# Patient Record
Sex: FEMALE | Race: WHITE | NOT HISPANIC OR LATINO | URBAN - METROPOLITAN AREA
[De-identification: names, ages, dates, MRNs, and addresses within clinical notes are randomized per-mention and may not be internally consistent; named-entity substitution may affect disease eponyms.]

---

## 2020-03-12 ENCOUNTER — APPOINTMENT (OUTPATIENT)
Dept: URBAN - METROPOLITAN AREA SURGERY 9 | Age: 55
Setting detail: DERMATOLOGY
End: 2020-03-12

## 2020-03-12 DIAGNOSIS — L30.8 OTHER SPECIFIED DERMATITIS: ICD-10-CM

## 2020-03-12 DIAGNOSIS — L81.4 OTHER MELANIN HYPERPIGMENTATION: ICD-10-CM

## 2020-03-12 DIAGNOSIS — L30.4 ERYTHEMA INTERTRIGO: ICD-10-CM

## 2020-03-12 DIAGNOSIS — L91.8 OTHER HYPERTROPHIC DISORDERS OF THE SKIN: ICD-10-CM

## 2020-03-12 DIAGNOSIS — D22 MELANOCYTIC NEVI: ICD-10-CM

## 2020-03-12 DIAGNOSIS — L82.1 OTHER SEBORRHEIC KERATOSIS: ICD-10-CM

## 2020-03-12 PROBLEM — D23.71 OTHER BENIGN NEOPLASM OF SKIN OF RIGHT LOWER LIMB, INCLUDING HIP: Status: ACTIVE | Noted: 2020-03-12

## 2020-03-12 PROBLEM — D22.5 MELANOCYTIC NEVI OF TRUNK: Status: ACTIVE | Noted: 2020-03-12

## 2020-03-12 PROCEDURE — OTHER REASSURANCE: OTHER

## 2020-03-12 PROCEDURE — OTHER PRESCRIPTION: OTHER

## 2020-03-12 PROCEDURE — OTHER COUNSELING: OTHER

## 2020-03-12 PROCEDURE — OTHER TREATMENT REGIMEN: OTHER

## 2020-03-12 PROCEDURE — OTHER OTHER: OTHER

## 2020-03-12 PROCEDURE — 99203 OFFICE O/P NEW LOW 30 MIN: CPT

## 2020-03-12 RX ORDER — FLUOCINONIDE 0.5 MG/G
CREAM TOPICAL
Qty: 1 | Refills: 0 | Status: ERX | COMMUNITY
Start: 2020-03-12

## 2020-03-12 ASSESSMENT — LOCATION ZONE DERM
LOCATION ZONE: ARM
LOCATION ZONE: HAND
LOCATION ZONE: TRUNK
LOCATION ZONE: FACE
LOCATION ZONE: LEG

## 2020-03-12 ASSESSMENT — LOCATION DETAILED DESCRIPTION DERM
LOCATION DETAILED: GLUTEAL CLEFT
LOCATION DETAILED: RIGHT RADIAL DORSAL HAND
LOCATION DETAILED: LEFT INFRAMAMMARY CREASE (INNER QUADRANT)
LOCATION DETAILED: RIGHT INFRAMAMMARY CREASE (INNER QUADRANT)
LOCATION DETAILED: LEFT MEDIAL FOREHEAD
LOCATION DETAILED: LEFT LATERAL SUPERIOR CHEST
LOCATION DETAILED: RIGHT PROXIMAL CALF
LOCATION DETAILED: LEFT ANTERIOR PROXIMAL THIGH
LOCATION DETAILED: RIGHT CENTRAL MALAR CHEEK
LOCATION DETAILED: LEFT ULNAR DORSAL HAND
LOCATION DETAILED: RIGHT ANTERIOR PROXIMAL THIGH
LOCATION DETAILED: LEFT INFERIOR CENTRAL MALAR CHEEK
LOCATION DETAILED: RIGHT SUPERIOR UPPER BACK
LOCATION DETAILED: RIGHT PROXIMAL POSTERIOR UPPER ARM
LOCATION DETAILED: LEFT PROXIMAL POSTERIOR UPPER ARM

## 2020-03-12 ASSESSMENT — LOCATION SIMPLE DESCRIPTION DERM
LOCATION SIMPLE: LEFT THIGH
LOCATION SIMPLE: LEFT FOREHEAD
LOCATION SIMPLE: RIGHT THIGH
LOCATION SIMPLE: RIGHT UPPER BACK
LOCATION SIMPLE: RIGHT CALF
LOCATION SIMPLE: RIGHT CHEEK
LOCATION SIMPLE: RIGHT BREAST
LOCATION SIMPLE: LEFT HAND
LOCATION SIMPLE: GLUTEAL CLEFT
LOCATION SIMPLE: LEFT CHEEK
LOCATION SIMPLE: LEFT BREAST
LOCATION SIMPLE: CHEST
LOCATION SIMPLE: RIGHT HAND
LOCATION SIMPLE: LEFT POSTERIOR UPPER ARM
LOCATION SIMPLE: RIGHT POSTERIOR UPPER ARM

## 2020-03-12 NOTE — PROCEDURE: OTHER
24-Jul-2018 09:11
Other (Free Text): Discussed shave removal if desired
Detail Level: Simple
Note Text (......Xxx Chief Complaint.): This diagnosis correlates with the

## 2020-03-12 NOTE — PROCEDURE: TREATMENT REGIMEN
Detail Level: Simple
Initiate Treatment: Fluocinonide cream as directed
Plan: Patient is being treated by GYN. Has rx’s at home
Otc Regimen: Zeasorb AF powder

## 2021-04-15 ENCOUNTER — APPOINTMENT (OUTPATIENT)
Dept: URBAN - METROPOLITAN AREA SURGERY 9 | Age: 56
Setting detail: DERMATOLOGY
End: 2021-04-15

## 2021-04-15 DIAGNOSIS — L30.4 ERYTHEMA INTERTRIGO: ICD-10-CM

## 2021-04-15 DIAGNOSIS — D18.0 HEMANGIOMA: ICD-10-CM

## 2021-04-15 DIAGNOSIS — D22 MELANOCYTIC NEVI: ICD-10-CM

## 2021-04-15 DIAGNOSIS — L81.4 OTHER MELANIN HYPERPIGMENTATION: ICD-10-CM

## 2021-04-15 DIAGNOSIS — L30.8 OTHER SPECIFIED DERMATITIS: ICD-10-CM

## 2021-04-15 PROBLEM — D23.72 OTHER BENIGN NEOPLASM OF SKIN OF LEFT LOWER LIMB, INCLUDING HIP: Status: ACTIVE | Noted: 2021-04-15

## 2021-04-15 PROBLEM — D22.5 MELANOCYTIC NEVI OF TRUNK: Status: ACTIVE | Noted: 2021-04-15

## 2021-04-15 PROBLEM — D23.71 OTHER BENIGN NEOPLASM OF SKIN OF RIGHT LOWER LIMB, INCLUDING HIP: Status: ACTIVE | Noted: 2021-04-15

## 2021-04-15 PROBLEM — D18.01 HEMANGIOMA OF SKIN AND SUBCUTANEOUS TISSUE: Status: ACTIVE | Noted: 2021-04-15

## 2021-04-15 PROCEDURE — OTHER PRESCRIPTION MEDICATION MANAGEMENT: OTHER

## 2021-04-15 PROCEDURE — OTHER TREATMENT REGIMEN: OTHER

## 2021-04-15 PROCEDURE — OTHER REASSURANCE: OTHER

## 2021-04-15 PROCEDURE — OTHER SUNSCREEN RECOMMENDATIONS: OTHER

## 2021-04-15 PROCEDURE — OTHER OTHER: OTHER

## 2021-04-15 PROCEDURE — OTHER COUNSELING: OTHER

## 2021-04-15 PROCEDURE — 99213 OFFICE O/P EST LOW 20 MIN: CPT

## 2021-04-15 ASSESSMENT — LOCATION ZONE DERM
LOCATION ZONE: TRUNK
LOCATION ZONE: ARM
LOCATION ZONE: FACE
LOCATION ZONE: HAND
LOCATION ZONE: NECK

## 2021-04-15 ASSESSMENT — LOCATION SIMPLE DESCRIPTION DERM
LOCATION SIMPLE: RIGHT UPPER BACK
LOCATION SIMPLE: GLUTEAL CLEFT
LOCATION SIMPLE: LEFT HAND
LOCATION SIMPLE: RIGHT HAND
LOCATION SIMPLE: POSTERIOR NECK
LOCATION SIMPLE: LEFT CHEEK
LOCATION SIMPLE: RIGHT FOREARM
LOCATION SIMPLE: LEFT BREAST
LOCATION SIMPLE: LEFT UPPER BACK
LOCATION SIMPLE: RIGHT BREAST
LOCATION SIMPLE: ABDOMEN
LOCATION SIMPLE: LEFT FOREARM

## 2021-04-15 ASSESSMENT — LOCATION DETAILED DESCRIPTION DERM
LOCATION DETAILED: GLUTEAL CLEFT
LOCATION DETAILED: RIGHT RIB CAGE
LOCATION DETAILED: RIGHT SUPERIOR MEDIAL UPPER BACK
LOCATION DETAILED: LEFT POSTERIOR NECK
LOCATION DETAILED: LEFT RADIAL DORSAL HAND
LOCATION DETAILED: LEFT DISTAL DORSAL FOREARM
LOCATION DETAILED: LEFT INFERIOR CENTRAL MALAR CHEEK
LOCATION DETAILED: LEFT INFRAMAMMARY CREASE (INNER QUADRANT)
LOCATION DETAILED: RIGHT RADIAL DORSAL HAND
LOCATION DETAILED: LEFT SUPERIOR LATERAL UPPER BACK
LOCATION DETAILED: RIGHT PROXIMAL DORSAL FOREARM
LOCATION DETAILED: RIGHT INFRAMAMMARY CREASE (INNER QUADRANT)

## 2021-04-15 NOTE — PROCEDURE: OTHER
Other (Free Text): Discussed shave removal if desired
Detail Level: Simple
Note Text (......Xxx Chief Complaint.): This diagnosis correlates with the

## 2021-04-15 NOTE — PROCEDURE: TREATMENT REGIMEN
Detail Level: Simple
Plan: Patient is being treated by GYN. Has rx’s at home
Otc Regimen: Zeasorb AF powder

## 2021-04-15 NOTE — PROCEDURE: PRESCRIPTION MEDICATION MANAGEMENT
Detail Level: Simple
Continue Regimen: Fluocinonide cream as prescribed. Call for refills when needed
Render In Strict Bullet Format?: No

## 2021-04-15 NOTE — PROCEDURE: REASSURANCE
Detail Level: Generalized
Hide Additional Notes?: No
Detail Level: Simple
Detail Level: Zone
Detail Level: Detailed

## 2022-04-15 ENCOUNTER — APPOINTMENT (OUTPATIENT)
Dept: URBAN - METROPOLITAN AREA SURGERY 9 | Age: 57
Setting detail: DERMATOLOGY
End: 2022-04-15

## 2022-04-15 DIAGNOSIS — L81.4 OTHER MELANIN HYPERPIGMENTATION: ICD-10-CM

## 2022-04-15 DIAGNOSIS — D22 MELANOCYTIC NEVI: ICD-10-CM

## 2022-04-15 DIAGNOSIS — L30.8 OTHER SPECIFIED DERMATITIS: ICD-10-CM

## 2022-04-15 DIAGNOSIS — L30.4 ERYTHEMA INTERTRIGO: ICD-10-CM

## 2022-04-15 DIAGNOSIS — D18.0 HEMANGIOMA: ICD-10-CM

## 2022-04-15 PROBLEM — D23.71 OTHER BENIGN NEOPLASM OF SKIN OF RIGHT LOWER LIMB, INCLUDING HIP: Status: ACTIVE | Noted: 2022-04-15

## 2022-04-15 PROBLEM — D18.01 HEMANGIOMA OF SKIN AND SUBCUTANEOUS TISSUE: Status: ACTIVE | Noted: 2022-04-15

## 2022-04-15 PROBLEM — D23.72 OTHER BENIGN NEOPLASM OF SKIN OF LEFT LOWER LIMB, INCLUDING HIP: Status: ACTIVE | Noted: 2022-04-15

## 2022-04-15 PROBLEM — D22.5 MELANOCYTIC NEVI OF TRUNK: Status: ACTIVE | Noted: 2022-04-15

## 2022-04-15 PROBLEM — D22.4 MELANOCYTIC NEVI OF SCALP AND NECK: Status: ACTIVE | Noted: 2022-04-15

## 2022-04-15 PROCEDURE — 99214 OFFICE O/P EST MOD 30 MIN: CPT

## 2022-04-15 PROCEDURE — OTHER COUNSELING: OTHER

## 2022-04-15 PROCEDURE — OTHER TREATMENT REGIMEN: OTHER

## 2022-04-15 PROCEDURE — OTHER REASSURANCE: OTHER

## 2022-04-15 PROCEDURE — OTHER SUNSCREEN RECOMMENDATIONS: OTHER

## 2022-04-15 PROCEDURE — OTHER PRESCRIPTION: OTHER

## 2022-04-15 RX ORDER — KETOCONAZOLE 20 MG/G
CREAM TOPICAL
Qty: 60 | Refills: 2 | Status: ERX | COMMUNITY
Start: 2022-04-15

## 2022-04-15 RX ORDER — HYDROCORTISONE 25 MG/G
CREAM TOPICAL
Qty: 60 | Refills: 1 | Status: ERX | COMMUNITY
Start: 2022-04-15

## 2022-04-15 ASSESSMENT — LOCATION DETAILED DESCRIPTION DERM
LOCATION DETAILED: RIGHT ULNAR PALM
LOCATION DETAILED: RIGHT SUPERIOR UPPER BACK
LOCATION DETAILED: RIGHT SUPERIOR MEDIAL UPPER BACK
LOCATION DETAILED: LEFT INFERIOR CENTRAL MALAR CHEEK
LOCATION DETAILED: LEFT INFRAMAMMARY CREASE (INNER QUADRANT)
LOCATION DETAILED: RIGHT PROXIMAL DORSAL FOREARM
LOCATION DETAILED: LEFT POSTERIOR NECK
LOCATION DETAILED: RIGHT MEDIAL TRAPEZIAL NECK
LOCATION DETAILED: LEFT SUPERIOR LATERAL UPPER BACK
LOCATION DETAILED: LEFT DISTAL DORSAL FOREARM
LOCATION DETAILED: LEFT THENAR EMINENCE
LOCATION DETAILED: LEFT INFERIOR LATERAL UPPER BACK
LOCATION DETAILED: RIGHT INFRAMAMMARY CREASE (INNER QUADRANT)

## 2022-04-15 ASSESSMENT — LOCATION SIMPLE DESCRIPTION DERM
LOCATION SIMPLE: LEFT FOREARM
LOCATION SIMPLE: LEFT UPPER BACK
LOCATION SIMPLE: RIGHT FOREARM
LOCATION SIMPLE: LEFT BREAST
LOCATION SIMPLE: LEFT HAND
LOCATION SIMPLE: RIGHT UPPER BACK
LOCATION SIMPLE: POSTERIOR NECK
LOCATION SIMPLE: RIGHT BREAST
LOCATION SIMPLE: LEFT CHEEK
LOCATION SIMPLE: RIGHT HAND

## 2022-04-15 ASSESSMENT — LOCATION ZONE DERM
LOCATION ZONE: FACE
LOCATION ZONE: HAND
LOCATION ZONE: ARM
LOCATION ZONE: TRUNK
LOCATION ZONE: NECK

## 2022-04-15 NOTE — PROCEDURE: REASSURANCE
Detail Level: Generalized
Hide Additional Notes?: No
Detail Level: Zone
Detail Level: Simple
Detail Level: Detailed

## 2022-04-15 NOTE — PROCEDURE: TREATMENT REGIMEN
Detail Level: Simple
Initiate Treatment: Ketoconazole cream and HC 2.5% cream as directed
Plan: Patient has been treated by GYN. Has rx’s at home, but unsure what they are. Will prescribe for her today as she is out of what GYN gave.
Otc Regimen: Zeasorb AF powder
Otc Regimen: Amlactin lotion or cream
Plan: Recommend wearing gloves when cleaning to prevent irritation from water and chemicals. \\nCan call for ammonium lactate lotion or topical steroid if needed. Declined topical steroid today.

## 2023-04-21 ENCOUNTER — APPOINTMENT (OUTPATIENT)
Dept: URBAN - METROPOLITAN AREA SURGERY 9 | Age: 58
Setting detail: DERMATOLOGY
End: 2023-04-21

## 2023-04-21 DIAGNOSIS — L24.9 IRRITANT CONTACT DERMATITIS, UNSPECIFIED CAUSE: ICD-10-CM

## 2023-04-21 DIAGNOSIS — L30.4 ERYTHEMA INTERTRIGO: ICD-10-CM

## 2023-04-21 DIAGNOSIS — D18.0 HEMANGIOMA: ICD-10-CM

## 2023-04-21 DIAGNOSIS — L30.8 OTHER SPECIFIED DERMATITIS: ICD-10-CM

## 2023-04-21 DIAGNOSIS — L84 CORNS AND CALLOSITIES: ICD-10-CM

## 2023-04-21 DIAGNOSIS — D22 MELANOCYTIC NEVI: ICD-10-CM

## 2023-04-21 DIAGNOSIS — L81.4 OTHER MELANIN HYPERPIGMENTATION: ICD-10-CM

## 2023-04-21 PROBLEM — D18.01 HEMANGIOMA OF SKIN AND SUBCUTANEOUS TISSUE: Status: ACTIVE | Noted: 2023-04-21

## 2023-04-21 PROBLEM — D23.71 OTHER BENIGN NEOPLASM OF SKIN OF RIGHT LOWER LIMB, INCLUDING HIP: Status: ACTIVE | Noted: 2023-04-21

## 2023-04-21 PROBLEM — D23.72 OTHER BENIGN NEOPLASM OF SKIN OF LEFT LOWER LIMB, INCLUDING HIP: Status: ACTIVE | Noted: 2023-04-21

## 2023-04-21 PROBLEM — D22.5 MELANOCYTIC NEVI OF TRUNK: Status: ACTIVE | Noted: 2023-04-21

## 2023-04-21 PROCEDURE — OTHER COUNSELING: OTHER

## 2023-04-21 PROCEDURE — OTHER PRESCRIPTION: OTHER

## 2023-04-21 PROCEDURE — OTHER PRESCRIPTION MEDICATION MANAGEMENT: OTHER

## 2023-04-21 PROCEDURE — OTHER TREATMENT REGIMEN: OTHER

## 2023-04-21 PROCEDURE — OTHER MIPS QUALITY: OTHER

## 2023-04-21 PROCEDURE — OTHER REASSURANCE: OTHER

## 2023-04-21 PROCEDURE — OTHER OBSERVATION: OTHER

## 2023-04-21 PROCEDURE — 99214 OFFICE O/P EST MOD 30 MIN: CPT

## 2023-04-21 PROCEDURE — OTHER OTHER: OTHER

## 2023-04-21 PROCEDURE — OTHER SUNSCREEN RECOMMENDATIONS: OTHER

## 2023-04-21 RX ORDER — HYDROCORTISONE 25 MG/G
CREAM TOPICAL
Qty: 60 | Refills: 1 | Status: ERX | COMMUNITY
Start: 2023-04-21

## 2023-04-21 RX ORDER — KETOCONAZOLE 20 MG/G
CREAM TOPICAL
Qty: 60 | Refills: 2 | Status: ERX | COMMUNITY
Start: 2023-04-21

## 2023-04-21 ASSESSMENT — LOCATION ZONE DERM
LOCATION ZONE: FACE
LOCATION ZONE: TOE
LOCATION ZONE: HAND
LOCATION ZONE: FEET
LOCATION ZONE: ARM
LOCATION ZONE: NECK
LOCATION ZONE: TRUNK

## 2023-04-21 ASSESSMENT — LOCATION SIMPLE DESCRIPTION DERM
LOCATION SIMPLE: LEFT FOREARM
LOCATION SIMPLE: RIGHT PLANTAR SURFACE
LOCATION SIMPLE: RIGHT FOREARM
LOCATION SIMPLE: POSTERIOR NECK
LOCATION SIMPLE: LEFT PLANTAR SURFACE
LOCATION SIMPLE: LEFT BREAST
LOCATION SIMPLE: RIGHT HAND
LOCATION SIMPLE: LEFT CHEEK
LOCATION SIMPLE: LEFT UPPER BACK
LOCATION SIMPLE: RIGHT BREAST
LOCATION SIMPLE: RIGHT CHEEK
LOCATION SIMPLE: RIGHT 2ND TOE
LOCATION SIMPLE: RIGHT UPPER BACK
LOCATION SIMPLE: LEFT HAND
LOCATION SIMPLE: LEFT 2ND TOE

## 2023-04-21 ASSESSMENT — LOCATION DETAILED DESCRIPTION DERM
LOCATION DETAILED: RIGHT ULNAR PALM
LOCATION DETAILED: RIGHT DORSAL 2ND TOE
LOCATION DETAILED: LEFT POSTERIOR NECK
LOCATION DETAILED: RIGHT INFRAMAMMARY CREASE (INNER QUADRANT)
LOCATION DETAILED: RIGHT MEDIAL BUCCAL CHEEK
LOCATION DETAILED: LEFT INFERIOR CENTRAL MALAR CHEEK
LOCATION DETAILED: RIGHT PROXIMAL DORSAL FOREARM
LOCATION DETAILED: LEFT INFRAMAMMARY CREASE (INNER QUADRANT)
LOCATION DETAILED: LEFT THENAR EMINENCE
LOCATION DETAILED: RIGHT SUPERIOR MEDIAL UPPER BACK
LOCATION DETAILED: LEFT SUPERIOR LATERAL UPPER BACK
LOCATION DETAILED: LEFT INFERIOR LATERAL UPPER BACK
LOCATION DETAILED: LEFT DISTAL DORSAL FOREARM
LOCATION DETAILED: RIGHT PLANTAR FOREFOOT OVERLYING 1ST METATARSAL
LOCATION DETAILED: LEFT DORSAL 2ND TOE
LOCATION DETAILED: LEFT PLANTAR FOREFOOT OVERLYING 2ND METATARSAL

## 2023-04-21 NOTE — PROCEDURE: TREATMENT REGIMEN
Continue Regimen: Ketoconazole cream and HC 2.5% cream as directed.\\nRecommend using the Ketoconazole 2-3x per week for maintenance.
Detail Level: Simple
Otc Regimen: Zeasorb AF powder\\nCarpe lotion (Antiperspirant)
Otc Regimen: Amlactin lotion or cream
Plan: Recommend wearing gloves when cleaning to prevent irritation from water and chemicals. \\nCan call for ammonium lactate lotion or topical steroid if needed. Declined topical steroid today.

## 2023-04-21 NOTE — PROCEDURE: OTHER
Other (Free Text): Will refer to Podiatry.
Render Risk Assessment In Note?: no
Note Text (......Xxx Chief Complaint.): This diagnosis correlates with the
Detail Level: Simple

## 2023-04-21 NOTE — PROCEDURE: PRESCRIPTION MEDICATION MANAGEMENT
Initiate Treatment: HC 2.5 % as directed\\nUse Aquaphor or Vaseline for maintenance
Detail Level: Zone
Render In Strict Bullet Format?: No

## 2024-05-23 ENCOUNTER — APPOINTMENT (OUTPATIENT)
Dept: URBAN - METROPOLITAN AREA SURGERY 9 | Age: 59
Setting detail: DERMATOLOGY
End: 2024-05-23

## 2024-05-23 DIAGNOSIS — D18.0 HEMANGIOMA: ICD-10-CM

## 2024-05-23 DIAGNOSIS — L72.0 EPIDERMAL CYST: ICD-10-CM

## 2024-05-23 DIAGNOSIS — L81.4 OTHER MELANIN HYPERPIGMENTATION: ICD-10-CM

## 2024-05-23 DIAGNOSIS — L84 CORNS AND CALLOSITIES: ICD-10-CM

## 2024-05-23 DIAGNOSIS — L30.4 ERYTHEMA INTERTRIGO: ICD-10-CM

## 2024-05-23 DIAGNOSIS — D22 MELANOCYTIC NEVI: ICD-10-CM

## 2024-05-23 PROBLEM — D18.01 HEMANGIOMA OF SKIN AND SUBCUTANEOUS TISSUE: Status: ACTIVE | Noted: 2024-05-23

## 2024-05-23 PROBLEM — D22.4 MELANOCYTIC NEVI OF SCALP AND NECK: Status: ACTIVE | Noted: 2024-05-23

## 2024-05-23 PROBLEM — D22.5 MELANOCYTIC NEVI OF TRUNK: Status: ACTIVE | Noted: 2024-05-23

## 2024-05-23 PROBLEM — D23.72 OTHER BENIGN NEOPLASM OF SKIN OF LEFT LOWER LIMB, INCLUDING HIP: Status: ACTIVE | Noted: 2024-05-23

## 2024-05-23 PROBLEM — D23.71 OTHER BENIGN NEOPLASM OF SKIN OF RIGHT LOWER LIMB, INCLUDING HIP: Status: ACTIVE | Noted: 2024-05-23

## 2024-05-23 PROCEDURE — 99213 OFFICE O/P EST LOW 20 MIN: CPT

## 2024-05-23 PROCEDURE — OTHER REASSURANCE: OTHER

## 2024-05-23 PROCEDURE — OTHER MIPS QUALITY: OTHER

## 2024-05-23 PROCEDURE — OTHER SUNSCREEN RECOMMENDATIONS: OTHER

## 2024-05-23 PROCEDURE — OTHER TREATMENT REGIMEN: OTHER

## 2024-05-23 PROCEDURE — OTHER COUNSELING: OTHER

## 2024-05-23 ASSESSMENT — LOCATION DETAILED DESCRIPTION DERM
LOCATION DETAILED: LEFT INFERIOR LATERAL NECK
LOCATION DETAILED: RIGHT PROXIMAL DORSAL FOREARM
LOCATION DETAILED: RIGHT DORSAL 2ND TOE
LOCATION DETAILED: RIGHT INFRAMAMMARY CREASE (INNER QUADRANT)
LOCATION DETAILED: RIGHT PLANTAR FOREFOOT OVERLYING 1ST METATARSAL
LOCATION DETAILED: RIGHT MEDIAL TRAPEZIAL NECK
LOCATION DETAILED: LEFT PLANTAR FOREFOOT OVERLYING 2ND METATARSAL
LOCATION DETAILED: LEFT SUPERIOR LATERAL UPPER BACK
LOCATION DETAILED: LEFT INFERIOR CENTRAL MALAR CHEEK
LOCATION DETAILED: LEFT INFRAMAMMARY CREASE (INNER QUADRANT)
LOCATION DETAILED: LEFT POSTERIOR NECK
LOCATION DETAILED: LEFT INFERIOR LATERAL UPPER BACK
LOCATION DETAILED: LEFT DISTAL DORSAL FOREARM
LOCATION DETAILED: RIGHT SUPERIOR MEDIAL UPPER BACK

## 2024-05-23 ASSESSMENT — LOCATION ZONE DERM
LOCATION ZONE: TRUNK
LOCATION ZONE: ARM
LOCATION ZONE: FEET
LOCATION ZONE: TOE
LOCATION ZONE: FACE
LOCATION ZONE: NECK

## 2024-05-23 ASSESSMENT — LOCATION SIMPLE DESCRIPTION DERM
LOCATION SIMPLE: RIGHT BREAST
LOCATION SIMPLE: LEFT PLANTAR SURFACE
LOCATION SIMPLE: RIGHT FOREARM
LOCATION SIMPLE: LEFT BREAST
LOCATION SIMPLE: RIGHT 2ND TOE
LOCATION SIMPLE: LEFT FOREARM
LOCATION SIMPLE: RIGHT UPPER BACK
LOCATION SIMPLE: LEFT UPPER BACK
LOCATION SIMPLE: POSTERIOR NECK
LOCATION SIMPLE: RIGHT PLANTAR SURFACE
LOCATION SIMPLE: LEFT CHEEK

## 2024-05-23 NOTE — PROCEDURE: TREATMENT REGIMEN
Continue Regimen: Ketoconazole cream and HC 2.5% cream as directed.\\nRecommend using the Ketoconazole 2-3x per week for maintenance.
Detail Level: Simple
Plan: Call for RF if needed
Otc Regimen: Zeasorb AF powder\\nCarpe lotion (Antiperspirant)

## 2025-05-23 ENCOUNTER — APPOINTMENT (OUTPATIENT)
Dept: URBAN - METROPOLITAN AREA SURGERY 9 | Age: 60
Setting detail: DERMATOLOGY
End: 2025-05-24

## 2025-05-23 DIAGNOSIS — D18.0 HEMANGIOMA: ICD-10-CM

## 2025-05-23 DIAGNOSIS — L30.4 ERYTHEMA INTERTRIGO: ICD-10-CM

## 2025-05-23 DIAGNOSIS — L81.4 OTHER MELANIN HYPERPIGMENTATION: ICD-10-CM

## 2025-05-23 DIAGNOSIS — D22 MELANOCYTIC NEVI: ICD-10-CM

## 2025-05-23 PROBLEM — D18.01 HEMANGIOMA OF SKIN AND SUBCUTANEOUS TISSUE: Status: ACTIVE | Noted: 2025-05-23

## 2025-05-23 PROBLEM — D22.4 MELANOCYTIC NEVI OF SCALP AND NECK: Status: ACTIVE | Noted: 2025-05-23

## 2025-05-23 PROBLEM — D23.72 OTHER BENIGN NEOPLASM OF SKIN OF LEFT LOWER LIMB, INCLUDING HIP: Status: ACTIVE | Noted: 2025-05-23

## 2025-05-23 PROBLEM — D23.71 OTHER BENIGN NEOPLASM OF SKIN OF RIGHT LOWER LIMB, INCLUDING HIP: Status: ACTIVE | Noted: 2025-05-23

## 2025-05-23 PROBLEM — D22.5 MELANOCYTIC NEVI OF TRUNK: Status: ACTIVE | Noted: 2025-05-23

## 2025-05-23 PROCEDURE — OTHER REASSURANCE: OTHER

## 2025-05-23 PROCEDURE — OTHER COUNSELING: OTHER

## 2025-05-23 PROCEDURE — 99214 OFFICE O/P EST MOD 30 MIN: CPT

## 2025-05-23 PROCEDURE — OTHER SUNSCREEN RECOMMENDATIONS: OTHER

## 2025-05-23 PROCEDURE — OTHER PRESCRIPTION: OTHER

## 2025-05-23 PROCEDURE — OTHER TREATMENT REGIMEN: OTHER

## 2025-05-23 RX ORDER — HYDROCORTISONE 25 MG/G
CREAM TOPICAL
Qty: 30 | Refills: 2 | Status: ERX

## 2025-05-23 RX ORDER — KETOCONAZOLE 20 MG/G
CREAM TOPICAL
Qty: 60 | Refills: 3 | Status: ERX

## 2025-05-23 ASSESSMENT — LOCATION DETAILED DESCRIPTION DERM
LOCATION DETAILED: RIGHT PROXIMAL DORSAL FOREARM
LOCATION DETAILED: LEFT INFERIOR LATERAL UPPER BACK
LOCATION DETAILED: LEFT INFRAMAMMARY CREASE (INNER QUADRANT)
LOCATION DETAILED: RIGHT INFRAMAMMARY CREASE (INNER QUADRANT)
LOCATION DETAILED: LEFT INFERIOR LATERAL NECK
LOCATION DETAILED: RIGHT MEDIAL TRAPEZIAL NECK
LOCATION DETAILED: LEFT POSTERIOR NECK
LOCATION DETAILED: RIGHT SUPERIOR MEDIAL UPPER BACK
LOCATION DETAILED: LEFT INFERIOR CENTRAL MALAR CHEEK
LOCATION DETAILED: LEFT DISTAL DORSAL FOREARM
LOCATION DETAILED: LEFT SUPERIOR LATERAL UPPER BACK

## 2025-05-23 ASSESSMENT — LOCATION SIMPLE DESCRIPTION DERM
LOCATION SIMPLE: LEFT FOREARM
LOCATION SIMPLE: POSTERIOR NECK
LOCATION SIMPLE: LEFT UPPER BACK
LOCATION SIMPLE: RIGHT UPPER BACK
LOCATION SIMPLE: LEFT BREAST
LOCATION SIMPLE: RIGHT FOREARM
LOCATION SIMPLE: LEFT CHEEK
LOCATION SIMPLE: RIGHT BREAST

## 2025-05-23 ASSESSMENT — LOCATION ZONE DERM
LOCATION ZONE: FACE
LOCATION ZONE: TRUNK
LOCATION ZONE: NECK
LOCATION ZONE: ARM